# Patient Record
Sex: FEMALE | Race: WHITE
[De-identification: names, ages, dates, MRNs, and addresses within clinical notes are randomized per-mention and may not be internally consistent; named-entity substitution may affect disease eponyms.]

---

## 2020-01-17 ENCOUNTER — APPOINTMENT (OUTPATIENT)
Dept: COLORECTAL SURGERY | Facility: CLINIC | Age: 45
End: 2020-01-17
Payer: COMMERCIAL

## 2020-01-17 VITALS
BODY MASS INDEX: 23.05 KG/M2 | RESPIRATION RATE: 14 BRPM | HEART RATE: 74 BPM | DIASTOLIC BLOOD PRESSURE: 83 MMHG | WEIGHT: 135 LBS | HEIGHT: 64 IN | SYSTOLIC BLOOD PRESSURE: 117 MMHG

## 2020-01-17 DIAGNOSIS — Z87.891 PERSONAL HISTORY OF NICOTINE DEPENDENCE: ICD-10-CM

## 2020-01-17 DIAGNOSIS — Z78.9 OTHER SPECIFIED HEALTH STATUS: ICD-10-CM

## 2020-01-17 DIAGNOSIS — Z80.0 FAMILY HISTORY OF MALIGNANT NEOPLASM OF DIGESTIVE ORGANS: ICD-10-CM

## 2020-01-17 DIAGNOSIS — K62.89 OTHER SPECIFIED DISEASES OF ANUS AND RECTUM: ICD-10-CM

## 2020-01-17 DIAGNOSIS — Z12.11 ENCOUNTER FOR SCREENING FOR MALIGNANT NEOPLASM OF COLON: ICD-10-CM

## 2020-01-17 DIAGNOSIS — L29.0 PRURITUS ANI: ICD-10-CM

## 2020-01-17 PROCEDURE — 46600 DIAGNOSTIC ANOSCOPY SPX: CPT

## 2020-01-17 PROCEDURE — 99243 OFF/OP CNSLTJ NEW/EST LOW 30: CPT | Mod: 25

## 2020-01-17 RX ORDER — PRAMOXINE HYDROCHLORIDE AND HYDROCORTISONE ACETATE 10; 25 MG/G; MG/G
2.5-1 CREAM TOPICAL
Qty: 1 | Refills: 3 | Status: ACTIVE | COMMUNITY
Start: 2020-01-17 | End: 1900-01-01

## 2020-01-17 NOTE — PHYSICAL EXAM
[Excoriation] : excoriations [Reduce Spontaneously] : a spontaneously reducible (grade II) [Normal] : was normal [Skin Tags] : there were no residual hemorrhoidal skin tags seen [No Rash or Lesion] : No rash or lesion [None] : there was no rectal abscess [Alert] : alert [Oriented to Person] : oriented to person [Calm] : calm [Oriented to Time] : oriented to time [Oriented to Place] : oriented to place [de-identified] : perianal skin irritation with excoriation extending proximally along  cleft [de-identified] : No apparent distress [de-identified] : Moving all extremities x4 [de-identified] : Normocephalic atraumatic

## 2020-01-17 NOTE — ASSESSMENT
[FreeTextEntry1] : Ms. Padilla presents to the office for discussion of a screening colonoscopy given a FH of colon cancer. The risks/benefits/alternatives for a colonoscopy were discussed. These include a less than 1% risk of bleeding should any polyps be biopsied and/or removed. There is also a less than 0.1% risk of perforation. The patient understands the need to adhere to a clear liquid diet the day prior to procedure as well as having to perform a bowel prep in order to allow for adequate visualization of the mucosal surfaces.  Followup colonoscopies will be scheduled based on the findings that are seen at the time of the procedure. Patient understands and is agreeable, and will proceed with consent and scheduling.\par She also presented to the office with signs and symptoms of pruritus ani. I discussed the causes for this dermatitis including over vigorous cleansing of the perianal skin, the misperception of hemorrhoidal burning as perianal skin irritation, and fecal leakage/seepage from loose stools. In order to allow for healing of the perianal skin, anal hygiene should be limited to rinsing the skin with warm water after a bowel movement, and then patting dry. Hydrocortisone cream 2.5% can be applied t.i.d. to facilitate healing. Finally, to address the pruritus symptoms that tend to awaken individuals at night, calmoseptine cream can be applied for symptomatic relief.\par

## 2020-01-17 NOTE — HISTORY OF PRESENT ILLNESS
[FreeTextEntry1] : Ms. Padilla presents to the office for discussion of a screening colonoscopy given FH of colon cancer.\par She denies any abdominal pain, blood in her stool or recent change in bowel habits.\par She reports recent perianal irritation that is more pronounced after wiping and after BMs.\par She recently underwent biopsy of the site by dermatologist.

## 2020-01-17 NOTE — CONSULT LETTER
[Dear  ___] : Dear  [unfilled], [Consult Letter:] : I had the pleasure of evaluating your patient, [unfilled]. [Please see my note below.] : Please see my note below. [Consult Closing:] : Thank you very much for allowing me to participate in the care of this patient.  If you have any questions, please do not hesitate to contact me. [Sincerely,] : Sincerely, [FreeTextEntry3] : Vanessa Mon MD\par

## 2020-02-13 ENCOUNTER — APPOINTMENT (OUTPATIENT)
Dept: COLORECTAL SURGERY | Facility: AMBULATORY MEDICAL SERVICES | Age: 45
End: 2020-02-13
Payer: COMMERCIAL

## 2020-02-13 PROCEDURE — 45378 DIAGNOSTIC COLONOSCOPY: CPT

## 2020-12-23 PROBLEM — Z12.11 ENCOUNTER FOR SCREENING COLONOSCOPY: Status: RESOLVED | Noted: 2020-01-17 | Resolved: 2020-12-23
